# Patient Record
Sex: MALE | Race: WHITE | NOT HISPANIC OR LATINO | Employment: FULL TIME | URBAN - METROPOLITAN AREA
[De-identification: names, ages, dates, MRNs, and addresses within clinical notes are randomized per-mention and may not be internally consistent; named-entity substitution may affect disease eponyms.]

---

## 2017-02-13 ENCOUNTER — OFFICE VISIT (OUTPATIENT)
Dept: OPHTHALMOLOGY | Facility: CLINIC | Age: 67
End: 2017-02-13
Payer: MEDICARE

## 2017-02-13 DIAGNOSIS — H43.812 POSTERIOR VITREOUS DETACHMENT, LEFT: ICD-10-CM

## 2017-02-13 DIAGNOSIS — H52.4 PRESBYOPIA: ICD-10-CM

## 2017-02-13 DIAGNOSIS — H43.21 ASTEROID HYALOSIS, RIGHT: ICD-10-CM

## 2017-02-13 DIAGNOSIS — H26.9 CATARACT: Primary | ICD-10-CM

## 2017-02-13 PROCEDURE — 92015 DETERMINE REFRACTIVE STATE: CPT | Performed by: OPHTHALMOLOGY

## 2017-02-13 PROCEDURE — 92014 COMPRE OPH EXAM EST PT 1/>: CPT | Performed by: OPHTHALMOLOGY

## 2017-02-13 ASSESSMENT — SLIT LAMP EXAM - LIDS: COMMENTS: 2+ DERMATOCHALASIS - UPPER LID

## 2017-02-13 ASSESSMENT — REFRACTION_WEARINGRX
OD_ADD: +2.25
OD_SPHERE: -1.25
OD_AXIS: 016
OS_CYLINDER: +2.25
OS_ADD: +2.25
OS_SPHERE: -1.25
OS_AXIS: 174
OD_CYLINDER: +2.50
SPECS_TYPE: PAL

## 2017-02-13 ASSESSMENT — EXTERNAL EXAM - RIGHT EYE: OD_EXAM: PROLAPSED FAT PADS: UPPER, LOWER

## 2017-02-13 ASSESSMENT — CONF VISUAL FIELD
OD_NORMAL: 1
OS_NORMAL: 1

## 2017-02-13 ASSESSMENT — EXTERNAL EXAM - LEFT EYE: OS_EXAM: PROLAPSED FAT PADS: UPPER, LOWER

## 2017-02-13 ASSESSMENT — TONOMETRY
IOP_METHOD: APPLANATION
OS_IOP_MMHG: 19
OD_IOP_MMHG: 18

## 2017-02-13 ASSESSMENT — REFRACTION_MANIFEST
OD_AXIS: 015
OD_SPHERE: -0.50
OD_CYLINDER: +2.25
OD_ADD: +2.50
OS_CYLINDER: +2.25
OS_SPHERE: -0.25
OS_AXIS: 174
OS_ADD: +2.50

## 2017-02-13 ASSESSMENT — VISUAL ACUITY
CORRECTION_TYPE: GLASSES
OS_CC: 2
OD_CC: 4
OS_CC+: +3
OS_CC: 20/25
METHOD: SNELLEN - LINEAR
OD_CC+: -2
OD_CC: 20/25

## 2017-02-13 ASSESSMENT — CUP TO DISC RATIO
OS_RATIO: 0.3
OD_RATIO: 0.3

## 2017-02-13 NOTE — PATIENT INSTRUCTIONS
Glasses Rx given -optional   Call in October 2017 for an appointment in February 2018 for Complete Exam    Dr. Mckeon [phone]

## 2017-02-13 NOTE — MR AVS SNAPSHOT
"              After Visit Summary   2/13/2017    Librado Quintana    MRN: [id number]           Patient Information     Date Of Birth          1950        Visit Information        Provider Department      2/13/2017 9:00 AM Dragan Mckeon MD [facility]        Today's Diagnoses     Asteroid hyalosis, mild-mod, right    -  1    Posterior vitreous detachment, left        Presbyopia        Myopia, bilateral        Astigmatism, bilateral          Care Instructions    Glasses Rx given -optional   Call in October 2017 for an appointment in February 2018 for Complete Exam    Dr. Mckeon [phone]        Follow-ups after your visit        Who to contact     If you have questions or need follow up information about today's clinic visit or your schedule please contact [facility] directly at [phone].  Normal or non-critical lab and imaging results will be communicated to you by MyChart, letter or phone within 4 business days after the clinic has received the results. If you do not hear from us within 7 days, please contact the clinic through MyChart or phone. If you have a critical or abnormal lab result, we will notify you by phone as soon as possible.  Submit refill requests through [employer] or call your pharmacy and they will forward the refill request to us. Please allow 3 business days for your refill to be completed.          Additional Information About Your Visit        MyChart Information     [employer] lets you send messages to your doctor, view your test results, renew your prescriptions, schedule appointments and more. To sign up, go to [email or web address] . Click on \"Log in\" on the left side of the screen, which will take you to the Welcome page. Then click on \"Sign up Now\" on the right side of the page.     You will be asked to enter the access code listed below, as well as some personal information. Please follow the directions to create your username and password.   "   Your access code is: [email or web address]  Expires: 2017 10:05 AM     Your access code will  in 90 days. If you need help or a new code, please call your [place] clinic or [phone].        Care EveryWhere ID     This is your Care EveryWhere ID. This could be used by other organizations to access your [place] medical records  [id number]         Blood Pressure from Last 3 Encounters:   No data found for BP    Weight from Last 3 Encounters:   No data found for Wt              Today, you had the following     No orders found for display       Primary Care Provider Fax #    [facility] [phone]       [address] [facility] [address]        Thank you!     Thank you for choosing [facility]  for your care. Our goal is always to provide you with excellent care. Hearing back from our patients is one way we can continue to improve our services. Please take a few minutes to complete the written survey that you may receive in the mail after your visit with us. Thank you!             Your Updated Medication List - Protect others around you: Learn how to safely use, store and throw away your medicines at www.disposemymeds.org.          This list is accurate as of: 17 10:05 AM.  Always use your most recent med list.                   Brand Name Dispense Instructions for use    GABAPENTIN PO      Take 50 mg by mouth       STRATTERA PO      Take 25 mg by mouth

## 2017-02-13 NOTE — PROGRESS NOTES
Current Eye Medications:  none     Subjective:  Comprehensive Eye Exam.  No vision changes or concerns.  Glasses are working well.      Objective:  See Ophthalmology Exam.       Assessment:  Stable eye exam.      ICD-10-CM    1. Cataract, mild, ou H26.9 EYE EXAM (SIMPLE-NONBILLABLE)   2. Asteroid hyalosis, mild-mod, right H43.21    3. Posterior vitreous detachment, left H43.812    4. Presbyopia H52.4 REFRACTIVE STATUS        Plan: Glasses Rx given -optional   Call in October 2017 for an appointment in February 2018 for Complete Exam    Dr. Mckeon [phone]

## 2017-02-15 PROBLEM — H26.9 CATARACT: Status: ACTIVE | Noted: 2017-02-15

## 2018-02-16 ENCOUNTER — OFFICE VISIT (OUTPATIENT)
Dept: OPHTHALMOLOGY | Facility: CLINIC | Age: 68
End: 2018-02-16
Payer: COMMERCIAL

## 2018-02-16 DIAGNOSIS — H52.4 PRESBYOPIA: ICD-10-CM

## 2018-02-16 DIAGNOSIS — Z01.01 ENCOUNTER FOR EXAMINATION OF EYES AND VISION WITH ABNORMAL FINDINGS: Primary | ICD-10-CM

## 2018-02-16 DIAGNOSIS — H25.813 COMBINED FORMS OF AGE-RELATED CATARACT OF BOTH EYES: ICD-10-CM

## 2018-02-16 DIAGNOSIS — H43.812 POSTERIOR VITREOUS DETACHMENT, LEFT: ICD-10-CM

## 2018-02-16 DIAGNOSIS — H43.21 ASTEROID HYALOSIS, RIGHT: ICD-10-CM

## 2018-02-16 PROBLEM — H26.9 CATARACT: Status: RESOLVED | Noted: 2017-02-15 | Resolved: 2018-02-16

## 2018-02-16 PROCEDURE — 92014 COMPRE OPH EXAM EST PT 1/>: CPT | Performed by: OPHTHALMOLOGY

## 2018-02-16 PROCEDURE — 92015 DETERMINE REFRACTIVE STATE: CPT | Performed by: OPHTHALMOLOGY

## 2018-02-16 ASSESSMENT — REFRACTION_MANIFEST
OS_ADD: +2.75
OD_SPHERE: -0.50
OD_ADD: +2.75
OS_AXIS: 177
OD_CYLINDER: +2.75
OS_SPHERE: -0.50
OD_AXIS: 010
OS_CYLINDER: +2.25

## 2018-02-16 ASSESSMENT — TONOMETRY
OS_IOP_MMHG: 19
IOP_METHOD: APPLANATION
OD_IOP_MMHG: 19

## 2018-02-16 ASSESSMENT — EXTERNAL EXAM - LEFT EYE: OS_EXAM: PROLAPSED FAT PADS: UPPER, LOWER

## 2018-02-16 ASSESSMENT — REFRACTION_WEARINGRX
OD_SPHERE: -1.25
OS_SPHERE: -1.25
OD_AXIS: 016
OD_CYLINDER: +2.50
OD_ADD: +2.25
SPECS_TYPE: PAL
OS_AXIS: 174
OS_ADD: +2.25
OS_CYLINDER: +2.25

## 2018-02-16 ASSESSMENT — SLIT LAMP EXAM - LIDS: COMMENTS: 2+ DERMATOCHALASIS - UPPER LID

## 2018-02-16 ASSESSMENT — CONF VISUAL FIELD
METHOD: COUNTING FINGERS
OS_NORMAL: 1
OD_NORMAL: 1

## 2018-02-16 ASSESSMENT — VISUAL ACUITY
METHOD: SNELLEN - LINEAR
OS_CC: 20/40
OD_CC: 20/50
OS_PH_CC: 20/25-1
OD_PH_CC: 20/20-2
CORRECTION_TYPE: GLASSES
OS_CC+: -2

## 2018-02-16 ASSESSMENT — CUP TO DISC RATIO
OS_RATIO: 0.3
OD_RATIO: 0.3

## 2018-02-16 ASSESSMENT — EXTERNAL EXAM - RIGHT EYE: OD_EXAM: PROLAPSED FAT PADS: UPPER, LOWER

## 2018-02-16 NOTE — LETTER
2/16/2018         RE: Librado Quintana  [address] [place] [address]        Dear Colleague,    Thank you for referring your patient, Librado Quintana, to the [facility]. Please see a copy of my visit note below.     Current Eye Medications:  no     Subjective:  Complete eye exam. Vision is good both eyes in distance. Trouble reading small print both eyes for last 2 years. Zero pain, or discomfort both eyes.     Objective:  See Ophthalmology Exam.       Assessment:  Stable eye exam.      ICD-10-CM    1. Encounter for examination of eyes and vision with abnormal findings Z01.01 REFRACTIVE STATUS   2. Presbyopia H52.4 REFRACTIVE STATUS   3. Combined forms of age-related cataract, mild, both eyes H25.813 EYE EXAM (SIMPLE-NONBILLABLE)   4. Asteroid hyalosis, mild-mod, right H43.21    5. Posterior vitreous detachment, left H43.812         Plan:  Glasses Rx given - optional   Possible posterior vitreous detachment (sudden onset large floater and/or flashing lights) discussed. Right eye.  Call in October 2018 for an appointment in February 2019 for Complete Exam    Dr. Mckeon [phone]         Again, thank you for allowing me to participate in the care of your patient.        Sincerely,        Dragan Mckeon MD

## 2018-02-16 NOTE — PROGRESS NOTES
Current Eye Medications:  no     Subjective:  Complete eye exam. Vision is good both eyes in distance. Trouble reading small print both eyes for last 2 years. Zero pain, or discomfort both eyes.     Objective:  See Ophthalmology Exam.       Assessment:  Stable eye exam.      ICD-10-CM    1. Encounter for examination of eyes and vision with abnormal findings Z01.01 REFRACTIVE STATUS   2. Presbyopia H52.4 REFRACTIVE STATUS   3. Combined forms of age-related cataract, mild, both eyes H25.813 EYE EXAM (SIMPLE-NONBILLABLE)   4. Asteroid hyalosis, mild-mod, right H43.21    5. Posterior vitreous detachment, left H43.812         Plan:  Glasses Rx given - optional   Possible posterior vitreous detachment (sudden onset large floater and/or flashing lights) discussed. Right eye.  Call in October 2018 for an appointment in February 2019 for Complete Exam    Dr. Mckeon [phone]

## 2018-02-16 NOTE — PATIENT INSTRUCTIONS
Glasses Rx given - optional   Possible posterior vitreous detachment (sudden onset large floater and/or flashing lights) discussed. Right eye.  Call in October 2018 for an appointment in February 2019 for Complete Exam    Dr. Mckeon [phone]

## 2018-02-16 NOTE — MR AVS SNAPSHOT
"              After Visit Summary   2/16/2018    Librado Quintana    MRN: [id number]           Patient Information     Date Of Birth          1950        Visit Information        Provider Department      2/16/2018 10:00 AM [facility]        Today's Diagnoses     Encounter for examination of eyes and vision with abnormal findings    -  1    Presbyopia        Combined forms of age-related cataract mild both eyes        Asteroid hyalosis, mild-mod, right        Posterior vitreous detachment, left          Care Instructions    Glasses Rx given - optional   Possible posterior vitreous detachment (sudden onset large floater and/or flashing lights) discussed. Right eye.  Call in October 2018 for an appointment in February 2019 for Complete Exam    Dr. Mckeon [phone]           Follow-ups after your visit        Who to contact     If you have questions or need follow up information about today's clinic visit or your schedule please contact [facility] directly at [phone].  Normal or non-critical lab and imaging results will be communicated to you by [employer], letter or phone within 4 business days after the clinic has received the results. If you do not hear from us within 7 days, please contact the clinic through [employer] or phone. If you have a critical or abnormal lab result, we will notify you by phone as soon as possible.  Submit refill requests through [employer] or call your pharmacy and they will forward the refill request to us. Please allow 3 business days for your refill to be completed.          Additional Information About Your Visit        [employer] Information     [employer] lets you send messages to your doctor, view your test results, renew your prescriptions, schedule appointments and more. To sign up, go to [email or web address] . Click on \"Log in\" on the left side of the screen, which will take you to the Welcome page. Then click on \"Sign up Now\" " on the right side of the page.     You will be asked to enter the access code listed below, as well as some personal information. Please follow the directions to create your username and password.     Your access code is: [email or web address]  Expires: 2018 11:23 AM     Your access code will  in 90 days. If you need help or a new code, please call your [place] clinic or [phone].        Care EveryWhere ID     This is your Care EveryWhere ID. This could be used by other organizations to access your [place] medical records  [id number]         Blood Pressure from Last 3 Encounters:   No data found for BP    Weight from Last 3 Encounters:   No data found for Wt              We Performed the Following     EYE EXAM (SIMPLE-NONBILLABLE)     REFRACTIVE STATUS        Primary Care Provider Fax #    [facility] [phone]       [address] [facility] [address]        Equal Access to Services     [facility] : Hadii willy doo Solien, waaxda lupatyadaha, qaybta kaalmada jason, shameka valverde . So [facility] [phone].    ATENCIÓN: Si habla español, tiene a robles disposición servicios gratuitos de asistencia lingüística. Marthaame al [phone].    We comply with applicable federal civil rights laws and [place] laws. We do not discriminate on the basis of race, color, national origin, age, disability, sex, sexual orientation, or gender identity.            Thank you!     Thank you for choosing [facility]  for your care. Our goal is always to provide you with excellent care. Hearing back from our patients is one way we can continue to improve our services. Please take a few minutes to complete the written survey that you may receive in the mail after your visit with us. Thank you!             Your Updated Medication List - Protect others around you: Learn how to safely use, store and throw away your medicines at www.disposemymeds.org.          This list is  accurate as of 2/16/18 11:23 AM.  Always use your most recent med list.                   Brand Name Dispense Instructions for use Diagnosis    GABAPENTIN PO      Take 50 mg by mouth        STRATTERA PO      Take 25 mg by mouth        UNKNOWN TO PATIENT      1 tablet daily Anxiety medication.

## 2018-02-17 PROBLEM — H25.813 COMBINED FORMS OF AGE-RELATED CATARACT OF BOTH EYES: Status: ACTIVE | Noted: 2018-02-17

## 2019-04-08 ENCOUNTER — OFFICE VISIT (OUTPATIENT)
Dept: OPHTHALMOLOGY | Facility: CLINIC | Age: 69
End: 2019-04-08
Payer: COMMERCIAL

## 2019-04-08 DIAGNOSIS — H43.812 POSTERIOR VITREOUS DETACHMENT, LEFT: ICD-10-CM

## 2019-04-08 DIAGNOSIS — H43.21 ASTEROID HYALOSIS, RIGHT: ICD-10-CM

## 2019-04-08 DIAGNOSIS — Z01.01 ENCOUNTER FOR EXAMINATION OF EYES AND VISION WITH ABNORMAL FINDINGS: Primary | ICD-10-CM

## 2019-04-08 DIAGNOSIS — H52.4 PRESBYOPIA: ICD-10-CM

## 2019-04-08 DIAGNOSIS — H25.813 COMBINED FORMS OF AGE-RELATED CATARACT OF BOTH EYES: ICD-10-CM

## 2019-04-08 PROCEDURE — 92015 DETERMINE REFRACTIVE STATE: CPT | Performed by: OPHTHALMOLOGY

## 2019-04-08 PROCEDURE — 92014 COMPRE OPH EXAM EST PT 1/>: CPT | Performed by: OPHTHALMOLOGY

## 2019-04-08 ASSESSMENT — SLIT LAMP EXAM - LIDS: COMMENTS: 2+ DERMATOCHALASIS - UPPER LID

## 2019-04-08 ASSESSMENT — CONF VISUAL FIELD
OD_NORMAL: 1
METHOD: COUNTING FINGERS
OS_NORMAL: 1

## 2019-04-08 ASSESSMENT — REFRACTION_WEARINGRX
OS_SPHERE: PLANO
OS_AXIS: 174
OD_SPHERE: -0.25
OS_ADD: +2.50
OD_CYLINDER: +3.00
OD_ADD: +2.50
SPECS_TYPE: PAL
OS_CYLINDER: +2.00
OD_AXIS: 014

## 2019-04-08 ASSESSMENT — EXTERNAL EXAM - LEFT EYE: OS_EXAM: PROLAPSED FAT PADS: UPPER, LOWER

## 2019-04-08 ASSESSMENT — CUP TO DISC RATIO
OD_RATIO: 0.3
OS_RATIO: 0.3

## 2019-04-08 ASSESSMENT — REFRACTION_MANIFEST
OD_ADD: +2.50
OS_CYLINDER: +2.25
OD_CYLINDER: +2.50
OS_AXIS: 175
OS_ADD: +2.50
OD_SPHERE: -0.50
OD_AXIS: 010
OS_SPHERE: PLANO

## 2019-04-08 ASSESSMENT — EXTERNAL EXAM - RIGHT EYE: OD_EXAM: PROLAPSED FAT PADS: UPPER, LOWER

## 2019-04-08 ASSESSMENT — VISUAL ACUITY
OD_CC+: -2
CORRECTION_TYPE: GLASSES
OS_CC+: -1
METHOD: SNELLEN - LINEAR
OD_CC: 20/20
OS_CC: 20/20

## 2019-04-08 ASSESSMENT — TONOMETRY
OD_IOP_MMHG: 21
IOP_METHOD: APPLANATION
OS_IOP_MMHG: 21

## 2019-04-08 NOTE — PROGRESS NOTES
Current Eye Medications:  none     Subjective:  Complete eye exam. Vision is doing well both eyes. No eye pain or discomfort in either eye.      Objective:  See Ophthalmology Exam.       Assessment:  Stable eye exam.      ICD-10-CM    1. Encounter for examination of eyes and vision with abnormal findings Z01.01 REFRACTION   2. Presbyopia H52.4 REFRACTION   3. Combined forms of age-related cataract, mild, both eyes H25.813 EYE EXAM (SIMPLE-NONBILLABLE)   4. Asteroid hyalosis, mild-mod, right H43.21    5. Posterior vitreous detachment, left H43.812         Plan:  Glasses Rx given - optional.  Use artificial tears up to 4 times daily both eyes.  (Refresh Tears, Systane Ultra/Balance, or Theratears)  Call in December 2019 for an appointment in April 2020 for Complete Exam.    Dr. Mckeon [phone]

## 2019-04-08 NOTE — LETTER
4/8/2019         RE: Librado Quintana  [address] [place] [address]        Dear Colleague,    Thank you for referring your patient, Librado Quintana, to the [facility]. Please see a copy of my visit note below.     Current Eye Medications:  none     Subjective:  Complete eye exam. Vision is doing well both eyes. No eye pain or discomfort in either eye.      Objective:  See Ophthalmology Exam.       Assessment:  Stable eye exam.      ICD-10-CM    1. Encounter for examination of eyes and vision with abnormal findings Z01.01 REFRACTION   2. Presbyopia H52.4 REFRACTION   3. Combined forms of age-related cataract, mild, both eyes H25.813 EYE EXAM (SIMPLE-NONBILLABLE)   4. Asteroid hyalosis, mild-mod, right H43.21    5. Posterior vitreous detachment, left H43.812         Plan:  Glasses Rx given - optional.  Use artificial tears up to 4 times daily both eyes.  (Refresh Tears, Systane Ultra/Balance, or Theratears)  Call in December 2019 for an appointment in April 2020 for Complete Exam.    Dr. Mckeon [phone]             Again, thank you for allowing me to participate in the care of your patient.        Sincerely,        Dragan Mckeon MD

## 2020-10-15 ENCOUNTER — OFFICE VISIT (OUTPATIENT)
Dept: OPHTHALMOLOGY | Facility: CLINIC | Age: 70
End: 2020-10-15
Payer: COMMERCIAL

## 2020-10-15 DIAGNOSIS — H52.4 PRESBYOPIA: ICD-10-CM

## 2020-10-15 DIAGNOSIS — H43.21 ASTEROID HYALOSIS, RIGHT: ICD-10-CM

## 2020-10-15 DIAGNOSIS — H25.813 COMBINED FORMS OF AGE-RELATED CATARACT OF BOTH EYES: ICD-10-CM

## 2020-10-15 DIAGNOSIS — Z01.01 ENCOUNTER FOR EXAMINATION OF EYES AND VISION WITH ABNORMAL FINDINGS: Primary | ICD-10-CM

## 2020-10-15 DIAGNOSIS — H43.813 POSTERIOR VITREOUS DETACHMENT OF BOTH EYES: ICD-10-CM

## 2020-10-15 PROCEDURE — 92014 COMPRE OPH EXAM EST PT 1/>: CPT | Performed by: OPHTHALMOLOGY

## 2020-10-15 PROCEDURE — 92015 DETERMINE REFRACTIVE STATE: CPT | Performed by: OPHTHALMOLOGY

## 2020-10-15 ASSESSMENT — VISUAL ACUITY
OD_CC: 20/25
OS_CC: 20/25
OD_CC+: -1
CORRECTION_TYPE: GLASSES
OS_CC+: +1
METHOD: SNELLEN - LINEAR

## 2020-10-15 ASSESSMENT — SLIT LAMP EXAM - LIDS: COMMENTS: 2+ DERMATOCHALASIS - UPPER LID

## 2020-10-15 ASSESSMENT — CONF VISUAL FIELD
OD_NORMAL: 1
OS_NORMAL: 1

## 2020-10-15 ASSESSMENT — TONOMETRY
OD_IOP_MMHG: 16
IOP_METHOD: APPLANATION
OS_IOP_MMHG: 17

## 2020-10-15 ASSESSMENT — REFRACTION_WEARINGRX
OS_CYLINDER: +2.00
OD_SPHERE: -0.25
SPECS_TYPE: PAL
OD_CYLINDER: +3.00
OS_ADD: +2.50
OD_AXIS: 014
OS_SPHERE: PLANO
OD_ADD: +2.50
OS_AXIS: 174

## 2020-10-15 ASSESSMENT — EXTERNAL EXAM - RIGHT EYE: OD_EXAM: PROLAPSED FAT PADS: UPPER, LOWER

## 2020-10-15 ASSESSMENT — REFRACTION_MANIFEST
OS_SPHERE: PLANO
OS_CYLINDER: +2.00
OD_ADD: +2.75
OS_ADD: +2.75
OS_AXIS: 175
OD_AXIS: 010
OD_CYLINDER: +2.50
OD_SPHERE: -0.25

## 2020-10-15 ASSESSMENT — CUP TO DISC RATIO
OD_RATIO: 0.3
OS_RATIO: 0.3

## 2020-10-15 ASSESSMENT — EXTERNAL EXAM - LEFT EYE: OS_EXAM: PROLAPSED FAT PADS: UPPER, LOWER

## 2020-10-15 NOTE — LETTER
10/15/2020         RE: Librado Quintana  [address] [place] [address]        Dear Colleague,    Thank you for referring your patient, Librado Quintana, to the [facility]. Please see a copy of my visit note below.     Current Eye Medications:  none     Subjective:  Here for complete. Hasn't noticed any new problems.      Objective:  See Ophthalmology Exam.       Assessment:  Mild worsening of cataracts.      ICD-10-CM    1. Encounter for examination of eyes and vision with abnormal findings  Z01.01    2. Presbyopia  H52.4    3. Combined forms of age-related cataract, mild, both eyes  H25.813    4. Asteroid hyalosis, mild-mod, right  H43.21    5. Posterior vitreous detachment of both eyes  H43.813         Plan:  Glasses Rx given - optional  Use artificial tears up to 4 times daily both eyes as needed.  (Refresh Tears, Systane Ultra/Balance, or Theratears)  Call in June 2021 for an appointment in October 2021 for Complete Exam    Dr. Mckeon [phone]           Again, thank you for allowing me to participate in the care of your patient.        Sincerely,        Dragan Mckoen MD

## 2020-10-15 NOTE — PATIENT INSTRUCTIONS
Glasses Rx given - optional  Use artificial tears up to 4 times daily both eyes as needed.  (Refresh Tears, Systane Ultra/Balance, or Theratears)  Call in June 2021 for an appointment in October 2021 for Complete Exam    Dr. Mckeon [phone]

## 2020-10-15 NOTE — PROGRESS NOTES
Current Eye Medications:  none     Subjective:  Here for complete. Hasn't noticed any new problems.      Objective:  See Ophthalmology Exam.       Assessment:  Mild worsening of cataracts.      ICD-10-CM    1. Encounter for examination of eyes and vision with abnormal findings  Z01.01    2. Presbyopia  H52.4    3. Combined forms of age-related cataract, mild, both eyes  H25.813    4. Asteroid hyalosis, mild-mod, right  H43.21    5. Posterior vitreous detachment of both eyes  H43.813         Plan:  Glasses Rx given - optional  Use artificial tears up to 4 times daily both eyes as needed.  (Refresh Tears, Systane Ultra/Balance, or Theratears)  Call in June 2021 for an appointment in October 2021 for Complete Exam    Dr. Mckeon [phone]

## 2020-10-16 PROBLEM — H43.813 POSTERIOR VITREOUS DETACHMENT OF BOTH EYES: Status: ACTIVE | Noted: 2020-10-16

## 2021-10-27 ENCOUNTER — OFFICE VISIT (OUTPATIENT)
Dept: OPHTHALMOLOGY | Facility: CLINIC | Age: 71
End: 2021-10-27
Payer: COMMERCIAL

## 2021-10-27 DIAGNOSIS — Z01.01 ENCOUNTER FOR EXAMINATION OF EYES AND VISION WITH ABNORMAL FINDINGS: Primary | ICD-10-CM

## 2021-10-27 DIAGNOSIS — H43.21 ASTEROID HYALOSIS, RIGHT: ICD-10-CM

## 2021-10-27 DIAGNOSIS — H52.4 PRESBYOPIA: ICD-10-CM

## 2021-10-27 DIAGNOSIS — H25.813 COMBINED FORMS OF AGE-RELATED CATARACT OF BOTH EYES: ICD-10-CM

## 2021-10-27 DIAGNOSIS — H43.813 POSTERIOR VITREOUS DETACHMENT OF BOTH EYES: ICD-10-CM

## 2021-10-27 PROCEDURE — 92015 DETERMINE REFRACTIVE STATE: CPT | Performed by: OPHTHALMOLOGY

## 2021-10-27 PROCEDURE — 92014 COMPRE OPH EXAM EST PT 1/>: CPT | Performed by: OPHTHALMOLOGY

## 2021-10-27 ASSESSMENT — REFRACTION_MANIFEST
OS_ADD: +2.75
OD_SPHERE: PLANO
OS_CYLINDER: +2.50
OD_ADD: +2.75
OD_CYLINDER: +2.00
OS_AXIS: 002
OD_AXIS: 007
OS_SPHERE: -0.25

## 2021-10-27 ASSESSMENT — VISUAL ACUITY
OD_CC: 20/30
OS_CC+: +1
OS_CC: 20/25
OD_CC+: -2
CORRECTION_TYPE: GLASSES
METHOD: SNELLEN - LINEAR

## 2021-10-27 ASSESSMENT — REFRACTION_WEARINGRX
OS_ADD: +2.75
OD_AXIS: 178
OS_AXIS: 165
OD_CYLINDER: +3.00
OS_SPHERE: -0.25
OD_ADD: +2.75
OS_CYLINDER: +2.50
OD_SPHERE: -0.25
SPECS_TYPE: PAL

## 2021-10-27 ASSESSMENT — TONOMETRY
IOP_METHOD: APPLANATION
OS_IOP_MMHG: 20
OD_IOP_MMHG: 20

## 2021-10-27 ASSESSMENT — EXTERNAL EXAM - LEFT EYE: OS_EXAM: PROLAPSED FAT PADS: UPPER, LOWER

## 2021-10-27 ASSESSMENT — CONF VISUAL FIELD
OD_NORMAL: 1
OS_NORMAL: 1

## 2021-10-27 ASSESSMENT — SLIT LAMP EXAM - LIDS: COMMENTS: 2+ DERMATOCHALASIS - UPPER LID

## 2021-10-27 ASSESSMENT — CUP TO DISC RATIO
OS_RATIO: 0.3
OD_RATIO: 0.3

## 2021-10-27 ASSESSMENT — EXTERNAL EXAM - RIGHT EYE: OD_EXAM: PROLAPSED FAT PADS: UPPER, LOWER

## 2021-10-27 NOTE — LETTER
10/27/2021         RE: Librado Quintana  [address] [place] [address]        Dear Colleague,    Thank you for referring your patient, Librado Quintana, to the [facility]. Please see a copy of my visit note below.     Current Eye Medications:  none     Subjective:  Here for complete eye exam today. Doing well, no new problems with the vision. 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      Objective:  See Ophthalmology Exam.       Assessment:  Stable eye exam.      ICD-10-CM    1. Encounter for examination of eyes and vision with abnormal findings  Z01.01    2. Presbyopia  H52.4 REFRACTIVE STATUS   3. Combined forms of age-related cataract, mild, both eyes  H25.813 EYE EXAM (SIMPLE-NONBILLABLE)   4. Asteroid hyalosis, mild-mod, right  H43.21    5. Posterior vitreous detachment of both eyes  H43.813         Plan:  Glasses Rx given - optional   May use artificial tears up to 4 times daily both eyes. (Refresh Tears, Systane Ultra/Balance, or Theratears)   Call in June 2022 for an appointment in October 2022 for Complete Exam    Dr. Mckeon [phone]           Again, thank you for allowing me to participate in the care of your patient.        Sincerely,        Dragan Mckeon MD

## 2021-10-27 NOTE — PATIENT INSTRUCTIONS
Glasses Rx given - optional   May use artificial tears up to 4 times daily both eyes. (Refresh Tears, Systane Ultra/Balance, or Theratears)   Call in June 2022 for an appointment in October 2022 for Complete Exam    Dr. Mckeon [phone]

## 2021-10-27 NOTE — PROGRESS NOTES
Current Eye Medications:  none     Subjective:  Here for complete eye exam today. Doing well, no new problems with the vision. 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      Objective:  See Ophthalmology Exam.       Assessment:  Stable eye exam.      ICD-10-CM    1. Encounter for examination of eyes and vision with abnormal findings  Z01.01    2. Presbyopia  H52.4 REFRACTIVE STATUS   3. Combined forms of age-related cataract, mild, both eyes  H25.813 EYE EXAM (SIMPLE-NONBILLABLE)   4. Asteroid hyalosis, mild-mod, right  H43.21    5. Posterior vitreous detachment of both eyes  H43.813         Plan:  Glasses Rx given - optional   May use artificial tears up to 4 times daily both eyes. (Refresh Tears, Systane Ultra/Balance, or Theratears)   Call in June 2022 for an appointment in October 2022 for Complete Exam    Dr. Mckeon [phone]

## 2023-01-24 ENCOUNTER — OFFICE VISIT (OUTPATIENT)
Dept: OPHTHALMOLOGY | Facility: CLINIC | Age: 73
End: 2023-01-24
Payer: COMMERCIAL

## 2023-01-24 DIAGNOSIS — H25.813 COMBINED FORMS OF AGE-RELATED CATARACT OF BOTH EYES: ICD-10-CM

## 2023-01-24 DIAGNOSIS — Z01.01 ENCOUNTER FOR EXAMINATION OF EYES AND VISION WITH ABNORMAL FINDINGS: Primary | ICD-10-CM

## 2023-01-24 DIAGNOSIS — H43.813 POSTERIOR VITREOUS DETACHMENT OF BOTH EYES: ICD-10-CM

## 2023-01-24 DIAGNOSIS — H43.21 ASTEROID HYALOSIS, RIGHT: ICD-10-CM

## 2023-01-24 DIAGNOSIS — H52.4 PRESBYOPIA: ICD-10-CM

## 2023-01-24 PROCEDURE — 92014 COMPRE OPH EXAM EST PT 1/>: CPT | Performed by: OPHTHALMOLOGY

## 2023-01-24 PROCEDURE — 92015 DETERMINE REFRACTIVE STATE: CPT | Performed by: OPHTHALMOLOGY

## 2023-01-24 ASSESSMENT — TONOMETRY
IOP_METHOD: APPLANATION
OS_IOP_MMHG: 20
OD_IOP_MMHG: 19

## 2023-01-24 ASSESSMENT — CUP TO DISC RATIO
OD_RATIO: 0.3
OS_RATIO: 0.4

## 2023-01-24 ASSESSMENT — REFRACTION_WEARINGRX
OS_CYLINDER: +2.50
OD_CYLINDER: +3.00
OD_AXIS: 178
SPECS_TYPE: PAL
OD_ADD: +2.75
OS_ADD: +2.75
OS_AXIS: 165
OS_SPHERE: -0.25
OD_SPHERE: -0.25

## 2023-01-24 ASSESSMENT — VISUAL ACUITY
CORRECTION_TYPE: GLASSES
OS_CC: 20/25
OD_CC+: -1
METHOD: SNELLEN - LINEAR
OD_CC: 20/25
OS_CC+: -1

## 2023-01-24 ASSESSMENT — CONF VISUAL FIELD
OS_NORMAL: 1
OS_SUPERIOR_NASAL_RESTRICTION: 0
METHOD: COUNTING FINGERS
OD_SUPERIOR_TEMPORAL_RESTRICTION: 0
OS_INFERIOR_TEMPORAL_RESTRICTION: 0
OD_NORMAL: 1
OD_SUPERIOR_NASAL_RESTRICTION: 0
OS_SUPERIOR_TEMPORAL_RESTRICTION: 0
OD_INFERIOR_TEMPORAL_RESTRICTION: 0
OD_INFERIOR_NASAL_RESTRICTION: 0
OS_INFERIOR_NASAL_RESTRICTION: 0

## 2023-01-24 ASSESSMENT — REFRACTION_MANIFEST
OD_AXIS: 010
OS_AXIS: 178
OD_CYLINDER: +2.00
OD_SPHERE: +0.25
OS_SPHERE: PLANO
OS_CYLINDER: +2.25
OD_ADD: +2.75
OS_ADD: +2.75

## 2023-01-24 ASSESSMENT — SLIT LAMP EXAM - LIDS: COMMENTS: 2+ DERMATOCHALASIS - UPPER LID

## 2023-01-24 ASSESSMENT — EXTERNAL EXAM - RIGHT EYE: OD_EXAM: PROLAPSED FAT PADS: UPPER, LOWER

## 2023-01-24 ASSESSMENT — EXTERNAL EXAM - LEFT EYE: OS_EXAM: PROLAPSED FAT PADS: UPPER, LOWER

## 2023-01-24 NOTE — LETTER
"    1/24/2023         RE: Librado Quintana  [address] [place] [address]        Dear Colleague,    Thank you for referring your patient, Librado Quintana, to the [facility]. Please see a copy of my visit note below.     Current Eye Medications:  None     Subjective:  Complete eye exam. Vision is doing well both eyes distance and near. No eye pain or discomfort in either eye.      Objective:  See Ophthalmology Exam.       Assessment:  Stable eye exam.      ICD-10-CM    1. Encounter for examination of eyes and vision with abnormal findings  Z01.01       2. Presbyopia  H52.4       3. Combined forms of age-related cataract, mild, both eyes  H25.813       4. Asteroid hyalosis, mild-mod, right  H43.21       5. Posterior vitreous detachment of both eyes  H43.813            Plan:  Glasses prescription given - optional  May use artificial tears up to four times a day (like Refresh Optive, Systane Balance, TheraTears, or generic artificial tears are ok. Avoid \"get the red out\" drops).   Call in September 2023 for an appointment in January 2024 for Complete Exam    Dr. Mckeon [phone]             Again, thank you for allowing me to participate in the care of your patient.        Sincerely,        Dragan Mckeon MD    "

## 2023-01-24 NOTE — PROGRESS NOTES
" Current Eye Medications:  None     Subjective:  Complete eye exam. Vision is doing well both eyes distance and near. No eye pain or discomfort in either eye.      Objective:  See Ophthalmology Exam.       Assessment:  Stable eye exam.      ICD-10-CM    1. Encounter for examination of eyes and vision with abnormal findings  Z01.01       2. Presbyopia  H52.4       3. Combined forms of age-related cataract, mild, both eyes  H25.813       4. Asteroid hyalosis, mild-mod, right  H43.21       5. Posterior vitreous detachment of both eyes  H43.813            Plan:  Glasses prescription given - optional  May use artificial tears up to four times a day (like Refresh Optive, Systane Balance, TheraTears, or generic artificial tears are ok. Avoid \"get the red out\" drops).   Call in September 2023 for an appointment in January 2024 for Complete Exam    Dr. Mckeon [phone]         "

## 2024-03-27 ENCOUNTER — OFFICE VISIT (OUTPATIENT)
Dept: OPHTHALMOLOGY | Facility: CLINIC | Age: 74
End: 2024-03-27
Payer: COMMERCIAL

## 2024-03-27 DIAGNOSIS — H52.4 PRESBYOPIA: ICD-10-CM

## 2024-03-27 DIAGNOSIS — Z01.01 ENCOUNTER FOR EXAMINATION OF EYES AND VISION WITH ABNORMAL FINDINGS: Primary | ICD-10-CM

## 2024-03-27 DIAGNOSIS — H25.813 COMBINED FORMS OF AGE-RELATED CATARACT OF BOTH EYES: ICD-10-CM

## 2024-03-27 DIAGNOSIS — H43.21 ASTEROID HYALOSIS, RIGHT: ICD-10-CM

## 2024-03-27 DIAGNOSIS — H43.813 POSTERIOR VITREOUS DETACHMENT OF BOTH EYES: ICD-10-CM

## 2024-03-27 PROCEDURE — 92015 DETERMINE REFRACTIVE STATE: CPT | Performed by: OPHTHALMOLOGY

## 2024-03-27 PROCEDURE — 92014 COMPRE OPH EXAM EST PT 1/>: CPT | Performed by: OPHTHALMOLOGY

## 2024-03-27 RX ORDER — ATORVASTATIN CALCIUM 80 MG/1
0.5 TABLET, FILM COATED ORAL DAILY
COMMUNITY
Start: 2022-03-21

## 2024-03-27 ASSESSMENT — EXTERNAL EXAM - RIGHT EYE: OD_EXAM: PROLAPSED FAT PADS: UPPER, LOWER

## 2024-03-27 ASSESSMENT — SLIT LAMP EXAM - LIDS: COMMENTS: 2+ DERMATOCHALASIS - UPPER LID

## 2024-03-27 ASSESSMENT — REFRACTION_WEARINGRX
OD_SPHERE: +0.25
OS_CYLINDER: +2.25
OS_ADD: +2.75
OD_ADD: +2.75
OD_CYLINDER: +2.00
OS_SPHERE: PLANO
OD_AXIS: 010
OS_AXIS: 178
SPECS_TYPE: PAL

## 2024-03-27 ASSESSMENT — REFRACTION_MANIFEST
OD_ADD: +2.75
OD_CYLINDER: +2.25
OD_SPHERE: PLANO
OS_AXIS: 180
OS_ADD: +2.75
OS_CYLINDER: +2.25
OD_AXIS: 012
OS_SPHERE: -0.25

## 2024-03-27 ASSESSMENT — TONOMETRY
IOP_METHOD: APPLANATION
OS_IOP_MMHG: 18
OD_IOP_MMHG: 20

## 2024-03-27 ASSESSMENT — CONF VISUAL FIELD
OS_INFERIOR_TEMPORAL_RESTRICTION: 0
OD_SUPERIOR_TEMPORAL_RESTRICTION: 0
OS_SUPERIOR_NASAL_RESTRICTION: 0
METHOD: COUNTING FINGERS
OD_SUPERIOR_NASAL_RESTRICTION: 0
OD_NORMAL: 1
OD_INFERIOR_TEMPORAL_RESTRICTION: 0
OS_NORMAL: 1
OD_INFERIOR_NASAL_RESTRICTION: 0
OS_SUPERIOR_TEMPORAL_RESTRICTION: 0
OS_INFERIOR_NASAL_RESTRICTION: 0

## 2024-03-27 ASSESSMENT — VISUAL ACUITY
OD_CC: 20/30-2
CORRECTION_TYPE: GLASSES
OS_CC: 20/20
METHOD: SNELLEN - LINEAR
OS_CC+: -2
OD_CC+: +1

## 2024-03-27 ASSESSMENT — CUP TO DISC RATIO
OD_RATIO: 0.3
OS_RATIO: 0.4

## 2024-03-27 ASSESSMENT — EXTERNAL EXAM - LEFT EYE: OS_EXAM: PROLAPSED FAT PADS: UPPER, LOWER

## 2024-03-27 NOTE — LETTER
"    3/27/2024         RE: Librado Quintana  [address] [place] [address]        Dear Colleague,    Thank you for referring your patient, Librado Quintana, to the [facility]. Please see a copy of my visit note below.     Current Eye Medications:  none     Subjective:  comprehensive eye exam - vision stable with updated gls - gls are heavy and nose pads dig into nose. No issues with pain or discomfort either eye.     Objective:  See Ophthalmology Exam.       Assessment:  Stable eye exam.      ICD-10-CM    1. Encounter for examination of eyes and vision with abnormal findings  Z01.01       2. Presbyopia  H52.4       3. Combined forms of age-related cataract, mild, both eyes  H25.813       4. Asteroid hyalosis, mild-mod, right  H43.21       5. Posterior vitreous detachment of both eyes  H43.813            Plan:  Glasses prescription given - optional    May use artificial tears up to four times a day (like Refresh Optive, Systane Balance, or TheraTears. Avoid \"get the red out\" drops and generic artifical tears).     Call in November 2024 for an appointment in March 2025 for Complete Exam    Dr. Mckeon [phone]         Again, thank you for allowing me to participate in the care of your patient.        Sincerely,        Dragan Mckeon MD  "

## 2024-03-27 NOTE — PATIENT INSTRUCTIONS
"Glasses prescription given - optional    May use artificial tears up to four times a day (like Refresh Optive, Systane Balance, or TheraTears. Avoid \"get the red out\" drops and generic artifical tears).     Call in November 2024 for an appointment in March 2025 for Complete Exam    Dr. Mckeon [phone]    "

## 2024-03-27 NOTE — PROGRESS NOTES
" Current Eye Medications:  none     Subjective:  comprehensive eye exam - vision stable with updated gls - gls are heavy and nose pads dig into nose. No issues with pain or discomfort either eye.     Objective:  See Ophthalmology Exam.       Assessment:  Stable eye exam.      ICD-10-CM    1. Encounter for examination of eyes and vision with abnormal findings  Z01.01       2. Presbyopia  H52.4       3. Combined forms of age-related cataract, mild, both eyes  H25.813       4. Asteroid hyalosis, mild-mod, right  H43.21       5. Posterior vitreous detachment of both eyes  H43.813            Plan:  Glasses prescription given - optional    May use artificial tears up to four times a day (like Refresh Optive, Systane Balance, or TheraTears. Avoid \"get the red out\" drops and generic artifical tears).     Call in November 2024 for an appointment in March 2025 for Complete Exam    Dr. Mckeon [phone]       "